# Patient Record
Sex: MALE | Race: WHITE | URBAN - METROPOLITAN AREA
[De-identification: names, ages, dates, MRNs, and addresses within clinical notes are randomized per-mention and may not be internally consistent; named-entity substitution may affect disease eponyms.]

---

## 2019-03-27 ENCOUNTER — APPOINTMENT (RX ONLY)
Dept: URBAN - METROPOLITAN AREA CLINIC 173 | Facility: CLINIC | Age: 79
Setting detail: DERMATOLOGY
End: 2019-03-27

## 2019-03-27 DIAGNOSIS — B35.1 TINEA UNGUIUM: ICD-10-CM

## 2019-03-27 DIAGNOSIS — L57.0 ACTINIC KERATOSIS: ICD-10-CM

## 2019-03-27 PROBLEM — L55.1 SUNBURN OF SECOND DEGREE: Status: ACTIVE | Noted: 2019-03-27

## 2019-03-27 PROBLEM — I10 ESSENTIAL (PRIMARY) HYPERTENSION: Status: ACTIVE | Noted: 2019-03-27

## 2019-03-27 PROBLEM — K21.9 GASTRO-ESOPHAGEAL REFLUX DISEASE WITHOUT ESOPHAGITIS: Status: ACTIVE | Noted: 2019-03-27

## 2019-03-27 PROBLEM — M12.9 ARTHROPATHY, UNSPECIFIED: Status: ACTIVE | Noted: 2019-03-27

## 2019-03-27 PROCEDURE — 17000 DESTRUCT PREMALG LESION: CPT

## 2019-03-27 PROCEDURE — ? PATIENT SPECIFIC COUNSELING

## 2019-03-27 PROCEDURE — 17003 DESTRUCT PREMALG LES 2-14: CPT

## 2019-03-27 PROCEDURE — ? COUNSELING

## 2019-03-27 PROCEDURE — ? LIQUID NITROGEN

## 2019-03-27 PROCEDURE — 99202 OFFICE O/P NEW SF 15 MIN: CPT | Mod: 25

## 2019-03-27 ASSESSMENT — LOCATION ZONE DERM
LOCATION ZONE: FACE
LOCATION ZONE: TOENAIL

## 2019-03-27 ASSESSMENT — LOCATION DETAILED DESCRIPTION DERM
LOCATION DETAILED: LEFT SUPERIOR PREAURICULAR CHEEK
LOCATION DETAILED: RIGHT GREAT TOENAIL
LOCATION DETAILED: LEFT LATERAL ZYGOMA
LOCATION DETAILED: LEFT MID PREAURICULAR CHEEK
LOCATION DETAILED: RIGHT CENTRAL ZYGOMA

## 2019-03-27 ASSESSMENT — LOCATION SIMPLE DESCRIPTION DERM
LOCATION SIMPLE: RIGHT GREAT TOE
LOCATION SIMPLE: RIGHT ZYGOMA
LOCATION SIMPLE: LEFT CHEEK
LOCATION SIMPLE: LEFT ZYGOMA

## 2019-03-27 NOTE — PROCEDURE: PATIENT SPECIFIC COUNSELING
Patient reports that he see a podiatrist who has been treating him.  He report that new nail appears to be growing normally.  Exam showed proximal clearing of nail plate.  Told patient to continue to monitor nail for continued outgrowth and trim back the dystrophic nail.
Detail Level: Simple